# Patient Record
Sex: MALE | Race: WHITE | NOT HISPANIC OR LATINO | URBAN - METROPOLITAN AREA
[De-identification: names, ages, dates, MRNs, and addresses within clinical notes are randomized per-mention and may not be internally consistent; named-entity substitution may affect disease eponyms.]

---

## 2021-06-06 ENCOUNTER — EMERGENCY (EMERGENCY)
Facility: HOSPITAL | Age: 36
LOS: 1 days | Discharge: HOME | End: 2021-06-06
Admitting: EMERGENCY MEDICINE
Payer: OTHER MISCELLANEOUS

## 2021-06-06 VITALS
DIASTOLIC BLOOD PRESSURE: 85 MMHG | WEIGHT: 214.95 LBS | HEIGHT: 68 IN | SYSTOLIC BLOOD PRESSURE: 133 MMHG | HEART RATE: 104 BPM | OXYGEN SATURATION: 95 % | RESPIRATION RATE: 18 BRPM | TEMPERATURE: 99 F

## 2021-06-06 VITALS
RESPIRATION RATE: 18 BRPM | OXYGEN SATURATION: 96 % | SYSTOLIC BLOOD PRESSURE: 124 MMHG | DIASTOLIC BLOOD PRESSURE: 72 MMHG | HEART RATE: 81 BPM | TEMPERATURE: 98 F

## 2021-06-06 DIAGNOSIS — R42 DIZZINESS AND GIDDINESS: ICD-10-CM
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
## 2021-06-06 DIAGNOSIS — S00.81XA ABRASION OF OTHER PART OF HEAD, INITIAL ENCOUNTER: ICD-10-CM
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
## 2021-06-06 DIAGNOSIS — M54.5 LOW BACK PAIN: ICD-10-CM

## 2021-06-06 DIAGNOSIS — S09.90XA UNSPECIFIED INJURY OF HEAD, INITIAL ENCOUNTER: ICD-10-CM

## 2021-06-06 DIAGNOSIS — Z23 ENCOUNTER FOR IMMUNIZATION: ICD-10-CM

## 2021-06-06 PROCEDURE — 99284 EMERGENCY DEPT VISIT MOD MDM: CPT

## 2021-06-06 PROCEDURE — 70486 CT MAXILLOFACIAL W/O DYE: CPT | Mod: 26

## 2021-06-06 PROCEDURE — 70450 CT HEAD/BRAIN W/O DYE: CPT | Mod: 26

## 2021-06-06 RX ORDER — TETANUS TOXOID, REDUCED DIPHTHERIA TOXOID AND ACELLULAR PERTUSSIS VACCINE, ADSORBED 5; 2.5; 8; 8; 2.5 [IU]/.5ML; [IU]/.5ML; UG/.5ML; UG/.5ML; UG/.5ML
0.5 SUSPENSION INTRAMUSCULAR ONCE
Refills: 0 | Status: COMPLETED | OUTPATIENT
Start: 2021-06-06 | End: 2021-06-06

## 2021-06-06 RX ADMIN — TETANUS TOXOID, REDUCED DIPHTHERIA TOXOID AND ACELLULAR PERTUSSIS VACCINE, ADSORBED 0.5 MILLILITER(S): 5; 2.5; 8; 8; 2.5 SUSPENSION INTRAMUSCULAR at 16:24

## 2021-06-06 NOTE — ED PROVIDER NOTE - MUSCULOSKELETAL, MLM
Spine appears normal, no midline tenderness or stepoff deformity, small area of tenderness over the left lateral paraspinal muscles, range of motion is not limited, no muscle or joint tenderness
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
## 2021-06-06 NOTE — ED PROVIDER NOTE - PATIENT PORTAL LINK FT
You can access the FollowMyHealth Patient Portal offered by [facility] by registering at the following website: [email or web address]. By joining [employer]’s FollowMyHealth portal, you will also be able to view your health information using other applications (apps) compatible with our system.

## 2021-06-06 NOTE — ED ADULT NURSE NOTE - CHIEF COMPLAINT
BIBA for evaluation of work related injury. c/o lower back pain, pain to L maxilla, and spit in mouth. +abrasions to hands. last tetanus unknown.
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
## 2021-06-06 NOTE — ED PROVIDER NOTE - SKIN, MLM
Skin normal color for race, warm, dry/No evidence of rash. multiple superficial abrasions to the dorsum of bilat hands

## 2021-06-06 NOTE — ED PROVIDER NOTE - EYES, MLM
Clear bilaterally, pupils equal, round and reactive to light. EOMI and painless with no double vision or nystagmus noted at extremes of gaze.

## 2021-06-06 NOTE — ED PROVIDER NOTE - ENMT, MLM
Airway patent, Nasal mucosa clear. Mouth with normal mucosa. Throat has no vesicles, no oropharyngeal exudates and uvula is midline. some tenderness to inferior aspect of left orbit with 1cm overlying abrasion.

## 2021-06-06 NOTE — ED ADULT TRIAGE NOTE - CHIEF COMPLAINT QUOTE
BIBA for evaluation of work related injury. c/o lower back pain, pain to L maxilla, and spit in mouth. +abrasions to hands. last tetanus unknown

## 2022-02-18 ENCOUNTER — EMERGENCY (EMERGENCY)
Facility: HOSPITAL | Age: 37
LOS: 1 days | Discharge: HOME | End: 2022-02-18
Admitting: EMERGENCY MEDICINE
Payer: OTHER MISCELLANEOUS

## 2022-02-18 VITALS
WEIGHT: 220.02 LBS | RESPIRATION RATE: 18 BRPM | TEMPERATURE: 98 F | DIASTOLIC BLOOD PRESSURE: 88 MMHG | OXYGEN SATURATION: 97 % | SYSTOLIC BLOOD PRESSURE: 136 MMHG | HEIGHT: 68 IN | HEART RATE: 94 BPM

## 2022-02-18 DIAGNOSIS — S50.312A ABRASION OF LEFT ELBOW, INITIAL ENCOUNTER: ICD-10-CM

## 2022-02-18 DIAGNOSIS — M25.522 PAIN IN LEFT ELBOW: ICD-10-CM
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
## 2022-02-18 DIAGNOSIS — S46.212A STRAIN OF MUSCLE, FASCIA AND TENDON OF OTHER PARTS OF BICEPS, LEFT ARM, INITIAL ENCOUNTER: ICD-10-CM
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
## 2022-02-18 PROBLEM — Z78.9 OTHER SPECIFIED HEALTH STATUS: Chronic | Status: ACTIVE | Noted: 2021-06-06

## 2022-02-18 PROCEDURE — 99284 EMERGENCY DEPT VISIT MOD MDM: CPT

## 2022-02-18 PROCEDURE — 73080 X-RAY EXAM OF ELBOW: CPT | Mod: 26,LT

## 2022-02-18 PROCEDURE — 99053 MED SERV 10PM-8AM 24 HR FAC: CPT

## 2022-02-18 RX ORDER — KETOROLAC TROMETHAMINE 30 MG/ML
30 SYRINGE (ML) INJECTION ONCE
Refills: 0 | Status: DISCONTINUED | OUTPATIENT
Start: 2022-02-18 | End: 2022-02-18

## 2022-02-18 RX ADMIN — Medication 30 MILLIGRAM(S): at 06:32
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
## 2022-02-18 NOTE — ED PROVIDER NOTE - PATIENT PORTAL LINK FT
You can access the FollowMyHealth Patient Portal offered by [facility] by registering at the following website: [email or web address]. By joining [employer]’s FollowMyHealth portal, you will also be able to view your health information using other applications (apps) compatible with our system.

## 2022-02-18 NOTE — ED PROVIDER NOTE - NS ED ROS FT
Review of Systems    Constitutional: (-) fever (-) weakness (-) diaphoresis   Cardiovascular: (-) chest pain  (-) palpitations  Respiratory: (-) SOB (-) cough   Msk: See HPI  Integumentary: (-) rash (-) redness   Neurological:  (-) focal deficit (-) altered mental status
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
## 2022-02-18 NOTE — ED PROVIDER NOTE - CARE PROVIDER_API CALL
Dave Navarro)  Orthopaedic Surgery Surgery  [address] [place] [address]  Phone: [phone]  Fax: [phone]  Follow Up Time: 1-3 Days
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
## 2022-02-18 NOTE — ED PROVIDER NOTE - PHYSICAL EXAMINATION
Physical Exam    Vital Signs: I have reviewed the initial vital signs.  Constitutional: well-nourished, appears stated age, no acute distress  Cardiovascular: regular rate, regular rhythm, well-perfused extremities, radial pulse +2 and equal b/l  Musculoskeletal: +TTP over the brachialis injury, full ROM in all joints of the UE b/l  Integumentary: warm, dry, no rash  Neurologic: extremities’ motor and sensory functions grossly intact in median, radial and ulnar n distribution
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
## 2023-06-11 ENCOUNTER — NON-APPOINTMENT (OUTPATIENT)
Age: 38
End: 2023-06-11

## 2023-08-23 PROBLEM — Z00.00 ENCOUNTER FOR PREVENTIVE HEALTH EXAMINATION: Status: ACTIVE | Noted: 2023-08-23

## 2024-05-07 ENCOUNTER — NON-APPOINTMENT (OUTPATIENT)
Age: 39
End: 2024-05-07

## 2024-12-17 ENCOUNTER — NON-APPOINTMENT (OUTPATIENT)
Age: 39
End: 2024-12-17

## 2025-06-03 NOTE — ED PROVIDER NOTE - PROGRESS NOTE DETAILS
RHEUMATOLOGY FOLLOW UP VISIT    Name: Chris Quintero  : 1962     Referred by: Maggi Cummings DO    Chief Complaint   Patient presents with    Rheumatoid Arthritis     2 month follow-up visit.  No pain reported at this time.       HISTORY OF PRESENT ILLNESS    This is a very pleasant  62 year old female here today for follow up for seropositive rheumatoid arthritis.  and fibromyalgia. She also has a history of a + PROSPER.  She has triple C alopecia , hyperlipidemia, and hypertension . She is retired now      She is a former patient of Dr Galaviz .   Past labs reveals a  + anti- CCP of 27.1 Units consistent with seropositive rheumatoid arthritis, her RF was negative.  She had an elevated C-RP and ESR of 98 mm/hr (lab under media on 2019) .  Her PROSPER was mildly positive at 1:80 in the past , her most recent PROSPER screen was negative . Her last visit was on 10/24/2023         Past medications: Meloxicam ( hx gastritis ) methotrexate ( too much hair loss )      Eye exam: May 1 , 2023 - no evidence of plaquenil induced retinopathy  , due for eye exam      She had a recent  DEXA scan , her lumbar spine T score was normal at 0.6 ( was -0.2 ) and her left hip T score was -1.0 ( was -1.4)  , left femoral neck T score is -1.4, right femoral neck T score is -1.5 , and right hip T score is -1.1 . Frax risk is 4.3 % for major fracture and hip fracture risk of 0.5 %      Her most recent labs reveal an ESR 13 mm/h, CMP reveals good kidney function test , and CBC reveals normal white blood cell and platelet count and no anemia on lab her vitamin D is optimal, last CMP revealed good kidney and liver function test, her liver function test did normalize     She is  on hydroxychloroquine 200 mg daily, on past visit Lyrica  was reduced to 75 mg daily. I started her on leflunomide 20 mg daily on last visit.     The patient is a 62-year-old female presenting for follow-up regarding her seropositive rheumatoid arthritis,  fibromyalgia, and other health concerns.    Rheumatoid Arthritis and Fibromyalgia  - Reports minimal pain.  - No swelling, fever, or chills.  - Weight loss of 2 pounds.  - No oral or nasal sores, dysphagia, chest pain, palpitations, shortness of breath, wheezing, gastrointestinal or urinary symptoms, peripheral neuropathy, psychological symptoms, or knee pain.  - Occasional shoulder pain when elevating her arm.  - Tolerates leflunomide well and found the dose pack beneficial.    Hypertension  - On antihypertensive medication for several months but believes it is ineffective.  - Advised to monitor blood pressure at home and return for follow-up every other week.  - Medication dosage increased by 20% and then 40%, but blood pressure remains elevated.  - Informed she may require a different prescription.  - Scheduled for lab work this month.    Dry Eyes  - Uses a spray twice daily and a heat band.    Alopecia  - Reports hair loss.    Allergies  - Mild cough attributed to allergies.    Supplemental information: None reported.    SOCIAL HISTORY  She is retired       REVIEW OF SYSTEMS  Constitutional: no fevers or chills + weight loss of 2 lbs since last visit   EYES: + dry eyes, uses dry eye spray no blurred vision, no diplopia, no history of iritis  ENT:  no dry mouth,no nasal  or oral ulcers, no dysphagia, no chronic sinus disease   Cardiovascular: no chest pain orthopnea no PND no palpitations  Respiratory: + cough with allergies , no shortness of breath no wheezing no stridor no dyspnea on exertion  GI: no nausea, no vomiting, no diarrhea, no constipation no heartburn, no abdominal pain  :no dysuria urgency , no hematuria  KALYANI: as above no raynauds  Heme:no history of deep venous thrombosis or pulmonary embolism no anemia, no swollen glands  CNS: no parasthesias,no weakness, no ataxia, no tremors, no dizziness  PSY :no anxiety or depression  INTEGUMENTARY: no hair loss, no rashes, hx alopecia   ENDO: No polyuria,  no polydipsia, not cushingoid    Past Medical History:   Diagnosis Date    Arthritis     Essential (primary) hypertension     Fibromyalgia     RAD (reactive airway disease) (CMD)     Rheumatoid arthritis (CMD)         Past Surgical History:   Procedure Laterality Date    Hysterectomy      Open access colonoscopy         Social History     Tobacco Use    Smoking status: Some Days     Types: Cigarettes     Passive exposure: Never    Smokeless tobacco: Never   Vaping Use    Vaping status: never used   Substance Use Topics    Alcohol use: Yes     Comment: pt stated social only.    Drug use: Never     Comment: NEVER       Current Outpatient Medications   Medication Sig Dispense Refill    hydroxychloroquine (PLAQUENIL) 200 MG tablet Take 1 tablet by mouth daily. 90 tablet 1    leflunomide (ARAVA) 20 MG tablet Take 1 tablet by mouth daily. 90 tablet 0    pregabalin (LYRICA) 75 MG capsule Take 1 capsule by mouth daily. 90 capsule 1    potassium CHLORIDE (KLOR-CON M) 20 MEQ christian ER tablet Take 20 mEq by mouth daily.      telmisartan (MICARDIS) 40 MG tablet Take 40 mg by mouth daily.      ketoconazole (NIZORAL) 2 % shampoo Apply to scalp during hair wash days 1-2 times a week. Leave on for 3-5 minutes, and then rinse off.      minoxidil (LONITEN) 2.5 MG tablet Take 1.25 mg by mouth daily.      fluticasone-salmeterol 250-50 MCG/ACT inhaler Inhale 1 puff into the lungs in the morning and 1 puff in the evening.      Omega-3 Fatty Acids (Fish Oil) 1000 MG capsule Take 2 g by mouth.      Restasis 0.05 % ophthalmic emulsion Place 1 drop into both eyes in the morning and 1 drop in the evening.      triamcinolone (KENALOG) 0.5 % cream use thin layer twice a day      albuterol (VENTOLIN HFA) 108 (90 Base) MCG/ACT inhaler Inhale 2 puffs into the lungs every 4 hours as needed.      fluticasone (FLONASE) 50 MCG/ACT nasal spray       hydrochlorothiazide (HYDRODIURIL) 25 MG tablet Take 25 mg by mouth.      simvastatin (ZOCOR) 20 MG tablet  Take 20 mg by mouth.       No current facility-administered medications for this visit.           PHYSICAL EXAM    Vitals:    06/03/25 1433 06/03/25 1437   BP: (!) 150/101 (!) 146/96   BP Location: LUE - Left upper extremity LUE - Left upper extremity   Patient Position: Sitting Sitting   Pulse: 92    Resp: 16    Temp: 97.6 °F (36.4 °C)    SpO2: 100%    Weight: 52.8 kg (116 lb 6.5 oz)    Height: 5' 5\" (1.651 m)    PainSc:  0      Body mass index is 19.37 kg/m².     Vital signs noted , elevated blood pressure noted , discussed with patient that she needs follow up with PCP regarding elevated blood pressure noted   Skin: no ulcers, no malar rash, no petechia no purpura.    Head and Face: Atraumatic no deformities normocephalic normal facies  Eyes: Pink conjunctiva, anicteric sclera, no periorbital swelling, no ptosis, pupils reactive to light, extraocular muscles intact.  No dry eyes.    ENT: No oral ulcers, no nasal ulcers,  no sinus tenderness, no malar rash, no temporal artery tenderness, no oral thrush, no dry mouth, no oral ulcers.  No TMJ tenderness     Neck: Fairly good range of motion of C-spine, no paracervical tenderness, no goiter, no adenopathy, no supraclavicular masses.    Cardiac exam: S1-S2 regular, no murmurs.    Lungs: clear, no rales or wheezing, no abnormal breath sounds, good breath sounds bilaterally.    Abdomen: no hepatomegaly or splenomegaly or tenderness, no masses, no ascites.    Back: no SI joint tenderness, no paralumbar tenderness,   Musculoskeletal exam:  Good range of motion bilateral shoulder joint with + bilateral shoulder tenderness  Bilateral elbows no synovitis or tenderness  Bilateral wrist joints no synovitis or tenderness  Bilateral MCP joints no synovitis or tenderness  Bilateral PIP joints no synovitis and tenderness  Bilateral DIP joints no synovitis or tenderness  Both knees no effusion warmth or tenderness no knee instability  Both ankles no synovitis or  tenderness  Bilateral MTP joints no synovitis or tenderness no dactylitis  Good range of motion bilateral hip joints with no tenderness    Neurological exam: Normal gait, normal motor strength in upper and lower extremity, normal muscle tone, no tremors alert oriented x3.  good bilateral hand , no muscle atrophy.    Tender joints =2  Swollen joints =0  ESR= 17 mm/hr  HARPER-28 score = 2.77  indicates low disease activity     General: Pleasant 62-year-old female, no acute distress.  Respiratory: Lungs clear to auscultation bilaterally.  Cardiovascular: Heart sounds normal, no murmurs.  Musculoskeletal: Shoulders: Mild pain with movement, no tenderness. Hands: No pain, good  strength. Knees: No pain.  Skin: No rashes or lesions observed.      LABS  Appointment on 06/03/2025   Component Date Value Ref Range Status    Microalbumin, Urine 06/03/2025 4.26  mg/dL Final    Creatinine, Urine 06/03/2025 263.0  mg/dL Final    Microalbumin/ Creatinine Ratio 06/03/2025 16.2  <30.0 mg/g Final       Appointment on 06/03/2025   Component Date Value Ref Range Status    Microalbumin, Urine 06/03/2025 4.26  mg/dL Final    Creatinine, Urine 06/03/2025 263.0  mg/dL Final    Microalbumin/ Creatinine Ratio 06/03/2025 16.2  <30.0 mg/g Final     Sedimentation Rate  Order: [id number]   Status: Final result       Dx: Rheumatoid arthritis, seropositive (C...    Test Result Released: Yes (seen)       Messages: Seen    Specimen Information: Blood, Venous   1 Result Note       1 Patient Communication       View Follow-Up Encounter            Component  Ref Range & Units (hover) 1 d ago  (6/3/25) 6 mo ago  (11/12/24) 10 mo ago  (7/30/24) 1 yr ago  (1/25/24) 1 yr ago  (7/10/23) 2 yr ago  (1/10/23) 2 yr ago  (10/5/22)   RBC Sedimentation Rate 17 13 11 17 8 29 High  17   [facility]ing [facility]             Specimen Collected: 06/03/25 15:01 Last Resulted: 06/03/25 23:37        Lab  Flowsheet        Order Details        View Encounter        Lab and Collection Details        Routing        Result History     View All Conversations on this Encounter           Labs   - Cholesterol: 03/2025, 217 mg/dL  Comprehensive Metabolic Panel  Order: [id number]   Status: Final result       Dx: Rheumatoid arthritis, seropositive (C...    Test Result Released: Yes (seen)       Messages: Seen    Specimen Information: Blood, Venous   1 Result Note       1 Patient Communication       View Follow-Up Encounter            Component  Ref Range & Units (hover) 1 d ago  (6/3/25) 6 mo ago  (11/12/24) 10 mo ago  (7/30/24) 1 yr ago  (1/25/24) 1 yr ago  (7/10/23) 2 yr ago  (1/10/23) 2 yr ago  (10/5/22)   Fasting Status 10 12 R R 12 R R   Sodium 139 143 147 High  139 141 141 147 High    Potassium 3.6 4.1 4.2 3.8 3.9 3.8 3.7   Chloride 108 111 High  113 High  106 105 106 110   Carbon Dioxide 26 29 26 28 28 28 28   Anion Gap 9 7 12 9 12 11 13   Glucose 81 83 77 82 81 89 84   BUN 15 11 11 16 14 12 13   Creatinine 0.65 0.77 0.81 0.75 0.75 0.69 0.82   Glomerular Filtration Rate >90 87 CM 82 CM 89 CM 90 CM >90 CM 81 CM   Comment: eGFR results = or >60 mL/min/1.73m2 = Normal kidney function. Estimated GFR calculated using the CKD-EPI-R (2021) equation that does not include race in the creatinine calculation.   BUN/Cr 23 14 14 21 19 17 16   Calcium 9.6 9.8 10.0 9.1 9.3 9.7 9.9   Bilirubin, Total 0.8 0.6 0.5 0.5 0.4 0.5 0.3   GOT/AST 28 60 High  43 High  39 High  78 High  41 High  23   GPT/ALT 21 55 41 72 High  89 High  44 22   Alkaline Phosphatase 66 97 80 79 106 72 58   Albumin 3.7 3.9 3.7 R 3.8 R 3.6 R 4.0 R 3.9 R   Protein, Total 6.9 6.7 6.6 6.7 6.7 7.0 6.9   Globulin 3.2 2.8 2.9 2.9 3.1 3.0 3.0   A/G Ratio 1.2 1.4 1.3 1.3 1.2 1.3 1.3   [facility]ing [facility]             Specimen Collected: 06/03/25 15:01 Last Resulted: 06/04/25 03:59        Lab Flowsheet        Order  Details        View Encounter        Lab and Collection Details        Routing        Result History     View All Conversations on this Encounter       CBC with Automated Differential (performable only)  Order: [id number] - Part of Panel Order [id number]   Status: Final result       Dx: Rheumatoid arthritis, seropositive (C...    Test Result Released: Yes (seen)       Messages: Seen    Specimen Information: Blood, Venous   1 Result Note       1 Patient Communication       View Follow-Up Encounter            Component  Ref Range & Units (hover) 1 d ago  (6/3/25) 6 mo ago  (11/12/24) 10 mo ago  (7/30/24) 1 yr ago  (1/25/24) 1 yr ago  (7/10/23) 2 yr ago  (1/10/23) 2 yr ago  (10/5/22)   WBC 5.3 5.0 4.8 6.9 5.5 4.9 4.6   RBC 4.52 4.15 4.11 4.06 4.44 4.57 4.49   HGB 12.4 12.3 11.4 Low  11.6 Low  12.7 12.9 12.4   HCT 39.9 38.8 36.5 36.8 39.9 40.7 40.8   MCV 88.3 93.5 88.8 90.6 89.9 89.1 90.9   MCH 27.4 29.6 27.7 28.6 28.6 28.2 27.6   MCHC 31.1 Low  31.7 Low  31.2 Low  31.5 Low  31.8 Low  31.7 Low  30.4 Low    RDW-CV 15.3 High  16.6 High  17.0 High  16.0 High  14.4 15.4 High  16.6 High    RDW-SD 49.5 56.2 High  54.1 High  52.4 High  46.5 50.1 High  55.4 High     244 252 269 246 247 308   NRBC 0 0 0 0 0 0 0   Neutrophil, Percent 64 59 48 CM 61 47 53 47   Lymphocytes, Percent 20 26 37 27 38 31 38   Mono, Percent 12 11 10 9 11 12 10   Eosinophils, Percent 3 2 3 1 2 2 4   Basophils, Percent 1 1 1 1 1 1 1   Immature Granulocytes 0 1 1 1 1 1 0   Absolute Neutrophils 3.4 3.0 2.4 4.2 2.6 2.6 2.2   Absolute Lymphocytes 1.1 1.3 1.8 1.9 2.1 1.5 1.7   Absolute Monocytes 0.7 0.6 0.5 0.6 0.6 0.6 0.4   Absolute Eosinophils 0.2 0.1 0.1 0.1 0.1 0.1 0.2   Absolute Basophils 0.1 0.0 0.0 0.0 0.0 0.1 0.0   Absolute Immature Granulocytes 0.0 0.0 0.0 0.1 0.1 0.0 0.0   [facility]ing [facility]              Narrative  Performed by: Laura  This is an appended report.  These results  have been appended to a previously verified report.   Specimen Collected: 06/03/25 15:01 Last Resulted: 06/03/25 22:04        Lab Flowsheet          Order Details          View Encounter          Lab and Collection Details          Routing          Result History       View All Conversations on this Encounter       ASSESSMENT  Impression: Very pleasant 62 year old female here today for follow up for seropositive rheumatoid arthritis doing pretty well on today's visit, she has marked improvement after the addition of leflunomide . I will have patient obtain labs for monitoring and continue current plan of care . She was advised to follow up with her PCP regarding elevated blood pressure on the past two visits .   Problem List Items Addressed This Visit          Advance Directives and General Issues     High risk medication use       Multi-system (Lupus,Sarcoid...)    Rheumatoid arthritis, seropositive (CMD)    Relevant Medications    hydroxychloroquine (PLAQUENIL) 200 MG tablet    leflunomide (ARAVA) 20 MG tablet     Other Visit Diagnoses         Fibromyalgia        Relevant Medications    pregabalin (LYRICA) 75 MG capsule           LABS   Orders Placed This Encounter    hydroxychloroquine (PLAQUENIL) 200 MG tablet    leflunomide (ARAVA) 20 MG tablet    pregabalin (LYRICA) 75 MG capsule     Labs placed for CBC, CMP, quantiferon TB, and    ESR   1. Seropositive rheumatoid arthritis.  Assessment: Tolerating leflunomide well with minimal pain and no swelling. No fever or chills. Hydroxychloroquine needs refill. CMP will be conducted.  Plan: Refill hydroxychloroquine and pregabalin prescriptions. Conduct CMP. Contact office if flare-ups. Follow-up in 3 months.    2. Fibromyalgia.  Assessment: Minimal pain, current management with leflunomide and pregabalin effective.  Plan: Continue current management. Contact office if symptoms worsen. Follow-up in 3 months.    3. Hypertension.  Assessment: Blood pressure remains  elevated at 146/96 despite being on medication.  Plan: Contact Dr. Maggi Cummings for further management. Conduct CMP.    4. Dry eyes.  Assessment: Uses spray twice daily and heat band.  Plan: Continue using spray and heat band as needed.    5. Allergies.  Assessment: Mild cough due to allergies.  Plan: Monitor and manage symptoms as needed.    Follow-up: Follow up in 3 months.      Risks of medical conditions and side effects of medication were discussed.  Medical compliance with plan discussed and risks of non-compliance reviewed.    Patient education completed on disease process, etiology & prognosis.    Patient expresses understanding of the plan.    Return to clinic in three months  as clinically indicated as discussed with patient who verbalized understanding of & agreement with the plan.       Leah Reveles, CNP               CT head is unremarkable. will give copy of results. feeling improved after rest. will d/c.